# Patient Record
Sex: FEMALE | Race: WHITE | NOT HISPANIC OR LATINO | URBAN - NONMETROPOLITAN AREA
[De-identification: names, ages, dates, MRNs, and addresses within clinical notes are randomized per-mention and may not be internally consistent; named-entity substitution may affect disease eponyms.]

---

## 2017-10-04 NOTE — PATIENT DISCUSSION
Patient understands condition, prognosis and need for follow up care. Darrold Sluder. No CME present.  CPM.

## 2017-12-06 NOTE — PATIENT DISCUSSION
Patient understands condition, prognosis and need for follow up care. Luis Manuel Valencia. No CME present.  CPM.

## 2020-07-06 ENCOUNTER — IMPORTED ENCOUNTER (OUTPATIENT)
Dept: URBAN - NONMETROPOLITAN AREA CLINIC 1 | Facility: CLINIC | Age: 60
End: 2020-07-06

## 2020-07-06 PROBLEM — H10.423: Noted: 2020-07-06

## 2020-07-06 PROBLEM — H43.813: Noted: 2020-07-06

## 2020-07-06 PROBLEM — H25.13: Noted: 2020-07-06

## 2020-07-06 PROBLEM — H52.4: Noted: 2020-07-06

## 2020-07-06 PROBLEM — H04.123: Noted: 2020-07-06

## 2020-07-06 PROCEDURE — S0621 ROUTINE OPHTHALMOLOGICAL EXA: HCPCS

## 2020-07-06 NOTE — PATIENT DISCUSSION
Presbyopia OUDiscussed refractive status in detail with patient. New glasses Rx given today. Continue to monitor. [place] OUDiscussed diagnosis with patient. Reviewed symptoms related to cataract progression. Discussed various treatment options with patient. No treatment required at this time. Continue to monitor. Ocular allergies OU:Discussed diagnosis in detail w/ pt today. Signs/symptoms associated discussed. Continue Patanol OU BID PRN. 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 Asymptomatic at this timeContinue warm moist compresses daily. Continue Systane PRN. Continue to monitor. PVD OUDiscussed findings of exam in detail with the patient. The risk of retinal detachment in patients with PVDs was discussed with the patient and the warning signs of retinal detachment were carefully reviewed with the patient. The patient was warned to return to the office or contact the ophthalmologist on call immediately if they experience signs of retinal detachment. Continue to monitor.

## 2021-04-19 ENCOUNTER — IMPORTED ENCOUNTER (OUTPATIENT)
Dept: URBAN - NONMETROPOLITAN AREA CLINIC 1 | Facility: CLINIC | Age: 61
End: 2021-04-19

## 2021-04-19 PROBLEM — H10.423: Noted: 2021-04-19

## 2021-04-19 PROBLEM — H52.4: Noted: 2021-04-19

## 2021-04-19 PROBLEM — H25.13: Noted: 2021-04-19

## 2021-04-19 PROBLEM — H43.813: Noted: 2021-04-19

## 2021-04-19 PROBLEM — H04.123: Noted: 2021-04-19

## 2021-04-19 PROCEDURE — 92012 INTRM OPH EXAM EST PATIENT: CPT

## 2021-04-19 NOTE — PATIENT DISCUSSION
Ocular Allergies OUDiscussed diagnosis in detail with patient. Papillae noted OS>OD. Start Pataday QD OU X 2 weeks then PRN. Patient to call if no improvement. Continue to monitor. NOTES FROM PREVIOUS EXAM:Presbyopia OUDiscussed refractive status in detail with patient. New glasses Rx given today. Continue to monitor. [place] OUDiscussed diagnosis with patient. Reviewed symptoms related to cataract progression. Discussed various treatment options with patient. No treatment required at this time. Continue to monitor. Ocular allergies OU:Discussed diagnosis in detail w/ pt today. Signs/symptoms associated discussed. Continue Patanol OU BID PRN. 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 Asymptomatic at this timeContinue warm moist compresses daily. Continue Systane PRN. Continue to monitor. PVD OUDiscussed findings of exam in detail with the patient. The risk of retinal detachment in patients with PVDs was discussed with the patient and the warning signs of retinal detachment were carefully reviewed with the patient. The patient was warned to return to the office or contact the ophthalmologist on call immediately if they experience signs of retinal detachment. Continue to monitor.

## 2021-07-12 ENCOUNTER — IMPORTED ENCOUNTER (OUTPATIENT)
Dept: URBAN - NONMETROPOLITAN AREA CLINIC 1 | Facility: CLINIC | Age: 61
End: 2021-07-12

## 2021-07-12 PROBLEM — H10.423: Noted: 2021-07-12

## 2021-07-12 PROBLEM — H10.423: Noted: 2021-04-19

## 2021-07-12 PROBLEM — H04.123: Noted: 2021-04-19

## 2021-07-12 PROBLEM — H43.813: Noted: 2021-04-19

## 2021-07-12 PROBLEM — H25.13: Noted: 2021-04-19

## 2021-07-12 PROBLEM — H52.4: Noted: 2021-07-12

## 2021-07-12 PROCEDURE — 92015 DETERMINE REFRACTIVE STATE: CPT

## 2021-07-12 PROCEDURE — 92014 COMPRE OPH EXAM EST PT 1/>: CPT

## 2021-07-12 NOTE — PATIENT DISCUSSION
Presbyopia OUDiscussed refractive status in detail with patient. New glasses Rx given today. Continue to monitor. Ocular Allergies OUDiscussed diagnosis in detail with patient. Papillae improved on today's exam.Continue Pataday QD OU/PRN. Patient to call if no improvement. Continue to monitor. [place] OUDiscussed diagnosis with patient. Reviewed symptoms related to cataract progression. Discussed various treatment options with patient. No treatment required at this time. Continue to monitor. MIROSLAVA OU:Dsicussed diagnosis in detail w/ pt today. Asymptomatic at this timeContinue warm moist compresses daily. Continue Systane PRN. Continue to monitor. PVD OUDiscussed findings of exam in detail with the patient. The risk of retinal detachment in patients with PVDs was discussed with the patient and the warning signs of retinal detachment were carefully reviewed with the patient. The patient was warned to return to the office or contact the ophthalmologist on call immediately if they experience signs of retinal detachment. Continue to monitor.

## 2022-04-09 ASSESSMENT — VISUAL ACUITY
OS_CC: 20/25-
OS_SC: 20/30
OD_SC: 20/20-1 SLOW
OS_SC: 20/25+
OS_PH: 20/25-
OD_PH: 20/25
OS_SC: 20/25-
OD_SC: 20/30
OD_CC: 20/60

## 2022-04-09 ASSESSMENT — TONOMETRY
OD_IOP_MMHG: 16
OD_IOP_MMHG: 15
OS_IOP_MMHG: 16
OD_IOP_MMHG: 16
OS_IOP_MMHG: 15
OS_IOP_MMHG: 16

## 2022-08-01 ENCOUNTER — COMPREHENSIVE EXAM (OUTPATIENT)
Dept: URBAN - NONMETROPOLITAN AREA CLINIC 1 | Facility: CLINIC | Age: 62
End: 2022-08-01

## 2022-08-01 DIAGNOSIS — H52.4: ICD-10-CM

## 2022-08-01 PROCEDURE — 92014 COMPRE OPH EXAM EST PT 1/>: CPT

## 2022-08-01 PROCEDURE — 92015 DETERMINE REFRACTIVE STATE: CPT

## 2022-08-01 ASSESSMENT — TONOMETRY
OD_IOP_MMHG: 14
OS_IOP_MMHG: 14

## 2022-08-01 ASSESSMENT — VISUAL ACUITY
OS_CC: 20/20
OD_CC: 20/20